# Patient Record
Sex: FEMALE | Race: WHITE | Employment: FULL TIME | URBAN - METROPOLITAN AREA
[De-identification: names, ages, dates, MRNs, and addresses within clinical notes are randomized per-mention and may not be internally consistent; named-entity substitution may affect disease eponyms.]

---

## 2017-10-25 ENCOUNTER — HOSPITAL ENCOUNTER (OUTPATIENT)
Dept: NON INVASIVE DIAGNOSTICS | Age: 62
Discharge: HOME | End: 2017-10-25
Attending: NURSE PRACTITIONER
Payer: COMMERCIAL

## 2017-10-25 DIAGNOSIS — R06.02 SOB (SHORTNESS OF BREATH): ICD-10-CM

## 2017-10-25 PROCEDURE — 93306 TTE W/DOPPLER COMPLETE: CPT

## 2017-10-30 ENCOUNTER — OFFICE VISIT (OUTPATIENT)
Dept: CARDIOLOGY CLINIC | Age: 62
End: 2017-10-30

## 2017-10-30 VITALS
OXYGEN SATURATION: 98 % | WEIGHT: 180 LBS | DIASTOLIC BLOOD PRESSURE: 70 MMHG | SYSTOLIC BLOOD PRESSURE: 120 MMHG | BODY MASS INDEX: 28.25 KG/M2 | HEART RATE: 78 BPM | HEIGHT: 67 IN | RESPIRATION RATE: 20 BRPM

## 2017-10-30 DIAGNOSIS — J45.909 UNCOMPLICATED ASTHMA, UNSPECIFIED ASTHMA SEVERITY, UNSPECIFIED WHETHER PERSISTENT: ICD-10-CM

## 2017-10-30 DIAGNOSIS — Z99.89 OSA ON CPAP: ICD-10-CM

## 2017-10-30 DIAGNOSIS — R06.00 DYSPNEA, UNSPECIFIED TYPE: ICD-10-CM

## 2017-10-30 DIAGNOSIS — I49.3 PVC (PREMATURE VENTRICULAR CONTRACTION): ICD-10-CM

## 2017-10-30 DIAGNOSIS — R07.89 CHEST TIGHTNESS: Primary | ICD-10-CM

## 2017-10-30 DIAGNOSIS — G47.33 OSA ON CPAP: ICD-10-CM

## 2017-10-30 RX ORDER — LANSOPRAZOLE 30 MG/1
CAPSULE, DELAYED RELEASE ORAL
COMMUNITY

## 2017-10-30 RX ORDER — FLUTICASONE PROPIONATE 50 MCG
2 SPRAY, SUSPENSION (ML) NASAL DAILY
COMMUNITY

## 2017-10-30 RX ORDER — FLUOXETINE HYDROCHLORIDE 40 MG/1
40 CAPSULE ORAL DAILY
COMMUNITY

## 2017-10-30 RX ORDER — GUAIFENESIN 600 MG/1
600 TABLET, EXTENDED RELEASE ORAL 2 TIMES DAILY
COMMUNITY

## 2017-10-30 RX ORDER — CHOLECALCIFEROL (VITAMIN D3) 125 MCG
CAPSULE ORAL
COMMUNITY

## 2017-10-30 RX ORDER — IBUPROFEN 400 MG/1
TABLET ORAL
COMMUNITY

## 2017-10-30 RX ORDER — MONTELUKAST SODIUM 10 MG/1
10 TABLET ORAL DAILY
COMMUNITY

## 2017-10-30 RX ORDER — ALBUTEROL SULFATE 90 UG/1
AEROSOL, METERED RESPIRATORY (INHALATION)
COMMUNITY

## 2017-10-30 RX ORDER — B-COMPLEX WITH VITAMIN C
CAPSULE ORAL
COMMUNITY

## 2017-10-30 NOTE — PROGRESS NOTES
Sangeetha Kim MD    [id number] [place] [place], [address] [facility]    Office [phone],[facility] [phone]  Pager [phone]    Vargas Olsen is a 62 y.o. female referred for evaluation of chest tightness. Consult requested by Jimmie Jeffries MD      Primary care physician:  Jimmie Jeffries MD      Chief complaint:  Vargas Olsen is a 62 y.o. female who complains of   Chief Complaint   Patient presents with    Shortness of Breath     ref by pyulmonary    Chest Pain     episodes of pvc     Dear Dr Heather Mixon,    I had the pleasure of seeing Mr. Vargas Olsen in the office today. 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 Plan:     Stress echocardiogram.  Holter monitor. Lipids per PCP  Follow-up in 3 months or earlier as needed/based on cardiac workup. Patient understands the plan. All questions were answered to the patient's satisfaction. Medication Side Effects and Warnings were discussed with patient: yes  Patient Labs were reviewed and or requested:  yes  Patient Past Records were reviewed and or requested: yes    I appreciate the opportunity to be involved in . Please see note below for details. Please do not hesitate to contact us with questions or concerns. Sangeetha Kim MD    Cardiac Testing/ Procedures: A. Cardiac Cath/PCI:    B.ECHO/ANNALISA: 10/25/17 Left ventricle: Systolic function was normal. Ejection fraction was estimated in the range of 55 % to 60 %. There were no regional wall motion  abnormalities. Doppler parameters were consistent with abnormal left ventricular relaxation (grade 1 diastolic dysfunction).     C.StressNuclear/Stress ECHO/Stress test:    D.Vascular:    E. EP:    F. 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 Initially she attributed it to her asthma. However she also had an episode of palpitations during her pulmonary MDs visit and was told that she had  PVCs. Chest tightness can occur with rest or with exertion. History of varicose veins- wears compression stockings. Mild swelling lower extremities-chronic. No prior history of CAD. Hx of being diagnosed with palpitations/MVP in her 29's. 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 No family history on file. Social History   Substance Use Topics    Smoking status: Never Smoker    Smokeless tobacco: Never Used    Alcohol use No          Medications before admission:    Current Outpatient Prescriptions   Medication Sig Dispense    ibuprofen (MOTRIN) 400 mg tablet Take  by mouth every six (6) hours as needed for Pain.  cholecalciferol, vitamin D3, (VITAMIN D3) 2,000 unit tab Take  by mouth.  montelukast (SINGULAIR) 10 mg tablet Take 10 mg by mouth daily.  LUTEIN EXTRACT/ZEAXANTHIN EXT (LUTEIN-ZEAXANTHIN) 15-0.7 mg cap Take  by mouth.  FLUoxetine (PROZAC) 40 mg capsule Take 40 mg by mouth daily.  LORATADINE (ALLERCLEAR PO) Take  by mouth.  lansoprazole (PREVACID) 30 mg capsule Take  by mouth Daily (before breakfast).  guaiFENesin (MUCINEX) 1,200 mg Ta12 ER tablet Take 1,200 mg by mouth two (2) times a day.  guaiFENesin ER (MUCINEX) 600 mg ER tablet Take 600 mg by mouth two (2) times a day.  mometasone-formoterol (DULERA) 200-5 mcg/actuation HFA inhaler Take 2 Puffs by inhalation two (2) times a day.  albuterol (VENTOLIN HFA) 90 mcg/actuation inhaler Take  by inhalation.  fluticasone (FLONASE) 50 mcg/actuation nasal spray 2 Sprays by Both Nostrils route daily.       No current facility-administered medications for this visit. Review of Systems:  (bold if positive, if negative)    Gen:  fatigueEyes:  ENT:  CVS: Pulm:  GI:    :    MS:  arthritisSkin:   hx of skin ca s/p removalPsych:  depressionEndo:    Hem:  Renal:    Neuro:        Physical Exam:  Visit Vitals    /70 (BP 1 Location: Left arm, BP Patient Position: Sitting)    Pulse 78    Resp 20    Ht 5' 7\" (1.702 m)    Wt 180 lb (81.6 kg)    SpO2 98%    BMI 28.19 kg/m2          Gen: Well-developed, well-nourished, in no acute distress  HEENT:  Pink conjunctivae, hearing intact to voice, moist mucous membranes  Neck: Supple,No JVD, No Carotid Bruit, Thyroid- non tender  Resp: No accessory muscle use, Clear breath sounds, No rales or rhonchi  Card: Regular Rate,Rythm,Normal S1, S2, No murmurs, rubs or gallop. No thrills. Abd:  Soft, non-tender, non-distended, normoactive bowel sounds are present,   MSK: No cyanosis   Skin: No rashes or ulcers  Neuro:  moving all four extremities, no focal deficit, follows commands appropriately  Psych:  Good insight, oriented to person, place and time, alert, Nml Affect  LE: No edema  Vascular: Radial Pulses 2+ and symmetric        EKG:  SR/Nml axis/NSST      Cxray:    LABS:    No results for input(s): CPK, TROIQ in the last 72 hours.     No lab exists for component: CKQMB, CPKMB    Lab Results   Component Value Date/Time    WBC 10.4 02/17/2012 04:55 AM    HGB 14.2 02/17/2012 04:55 AM    HCT 41.5 02/17/2012 04:55 AM    PLATELET 752 26/18/8933 04:55 AM    MCV 90.6 02/17/2012 04:55 AM     Lab Results   Component Value Date/Time    Sodium 140 02/17/2012 04:55 AM    Potassium 3.9 02/17/2012 04:55 AM    Chloride 108 02/17/2012 04:55 AM    CO2 23 02/17/2012 04:55 AM    Anion gap 9 02/17/2012 04:55 AM    Glucose 131 02/17/2012 04:55 AM    BUN 17 02/17/2012 04:55 AM    Creatinine 0.9 02/17/2012 04:55 AM    BUN/Creatinine ratio 19 02/17/2012 04:55 AM    GFR est AA >60 02/17/2012 04:55 AM    GFR est non-AA >60 02/17/2012 04:55 AM    Calcium 9.0 02/17/2012 04:55 AM             Poly Moore MD

## 2017-10-30 NOTE — MR AVS SNAPSHOT
Visit Information Date & Time Provider Department Dept. Phone Encounter #  
 10/30/2017 10:00 AM Andrew Case MD [facility] [phone] [id number] Upcoming Health Maintenance Date Due Hepatitis C Screening 1955 DTaP/Tdap/Td series (1 - Tdap) 11/5/1977 PAP AKA CERVICAL CYTOLOGY 11/5/1977 BREAST CANCER SCRN MAMMOGRAM 11/5/2006 FOBT Q 1 YEAR AGE 50-75 11/5/2006 ZOSTER VACCINE AGE 60> 9/5/2016 INFLUENZA AGE 9 TO ADULT 8/1/2017 Allergies as of 10/30/2017  Review Complete On: 10/30/2017 By: Jinny Summers LPN Severity Noted Reaction Type Reactions Amoxicillin  02/17/2012    Hives Erythromycin  02/17/2012    Hives Current Immunizations  Never Reviewed No immunizations on file. Not reviewed this visit You Were Diagnosed With   
  
 Codes Comments Chest tightness    -  Primary ICD-10-CM: R07.89 ICD-9-CM: 786.59   
 JOI on CPAP     ICD-10-CM: G47.33, Z99.89 ICD-9-CM: 327.23, V46.8 Uncomplicated asthma, unspecified asthma severity, unspecified whether persistent     ICD-10-CM: J45.909 ICD-9-CM: 493.90 Vitals BP Pulse Resp Height(growth percentile) Weight(growth percentile) SpO2  
 120/70 (BP 1 Location: Left arm, BP Patient Position: Sitting) 78 20 5' 7\" (1.702 m) 180 lb (81.6 kg) 98% BMI Smoking Status 28.19 kg/m2 Never Smoker Vitals History BMI and BSA Data Body Mass Index Body Surface Area  
 28.19 kg/m 2 1.96 m 2 Your Updated Medication List  
  
   
This list is accurate as of: 10/30/17 11:19 AM.  Always use your most recent med list.  
  
  
  
  
 Marcia Cleverly Take  by mouth. DULERA 200-5 mcg/actuation HFA inhaler Generic drug:  mometasone-formoterol Take 2 Puffs by inhalation two (2) times a day. FLONASE 50 mcg/actuation nasal spray Generic drug:  fluticasone 2 Sprays by Both Nostrils route daily. FLUoxetine 40 mg capsule Commonly known as:  PROzac Take 40 mg by mouth daily. ibuprofen 400 mg tablet Commonly known as:  MOTRIN Take  by mouth every six (6) hours as needed for Pain.  
  
 lansoprazole 30 mg capsule Commonly known as:  PREVACID Take  by mouth Daily (before breakfast). lutein-zeaxanthin 15-0.7 mg Cap Take  by mouth. * MUCINEX 1,200 mg Ta12 ER tablet Generic drug:  guaiFENesin Take 1,200 mg by mouth two (2) times a day. * MUCINEX 600 mg ER tablet Generic drug:  guaiFENesin ER Take 600 mg by mouth two (2) times a day. SINGULAIR 10 mg tablet Generic drug:  montelukast  
Take 10 mg by mouth daily. VENTOLIN HFA 90 mcg/actuation inhaler Generic drug:  albuterol Take  by inhalation. VITAMIN D3 2,000 unit Tab Generic drug:  cholecalciferol (vitamin D3) Take  by mouth. * Notice: This list has 2 medication(s) that are the same as other medications prescribed for you. Read the directions carefully, and ask your doctor or other care provider to review them with you. We Performed the Following AMB POC EKG ROUTINE W/ 12 LEADS, INTER & REP [06551 CPT(R)] Introducing [facility]! [place] introduces [employer] patient portal. Now you can access parts of your medical record, email your doctor's office, and request medication refills online. 1. In your internet browser, go to [email or web address]. [email or web address] 2. Click on the First Time User? Click Here link in the Sign In box. You will see the New Member Sign Up page. 3. Enter your [employer] Access Code exactly as it appears below. You will not need to use this code after youve completed the sign-up process. If you do not sign up before the expiration date, you must request a new code. · [employer] Access Code: [email or web address] 
Expires: 1/22/2018 10:54 AM 
 
4.  Enter the last four digits of your Social Security Number (xxxx) and Date of Birth (mm/dd/yyyy) as indicated and click Submit. You will be taken to the next sign-up page. 5. Create a [employer] ID. This will be your [employer] login ID and cannot be changed, so think of one that is secure and easy to remember. 6. Create a [employer] password. You can change your password at any time. 7. Enter your Password Reset Question and Answer. This can be used at a later time if you forget your password. 8. Enter your e-mail address. You will receive e-mail notification when new information is available in [address] 9. Click Sign Up. You can now view and download portions of your medical record. 10. Click the Download Summary menu link to download a portable copy of your medical information. If you have questions, please visit the Frequently Asked Questions section of the [employer] website. Remember, [employer] is NOT to be used for urgent needs. For medical emergencies, dial 911. Now available from your iPhone and Android! Please provide this summary of care documentation to your next provider. Your primary care clinician is listed as Rand Friend. If you have any questions after today's visit, please call [phone].

## 2017-11-20 ENCOUNTER — CLINICAL SUPPORT (OUTPATIENT)
Dept: CARDIOLOGY CLINIC | Age: 62
End: 2017-11-20

## 2017-11-20 DIAGNOSIS — R06.02 SOB (SHORTNESS OF BREATH): ICD-10-CM

## 2017-11-20 DIAGNOSIS — R07.89 CHEST DISCOMFORT: Primary | ICD-10-CM

## 2017-11-20 DIAGNOSIS — R00.2 PALPITATIONS: ICD-10-CM

## 2017-11-20 DIAGNOSIS — R00.2 PALPITATIONS: Primary | ICD-10-CM

## 2017-11-20 NOTE — PROGRESS NOTES
Explained procedure to patient, monitoring EKG/HR/BP, obtaining resting images, walking on treadmill, obtaining post exercise images, and risks/discomforts. All concerns and questions addressed. Consent obtained. See scanned report. Pt achieved 7. 4METS, HR of 157 (98% of THR), and SpO2 of 98% at peak exercise. Dr. Wally Vanegas ordered and Dr. Wally Vanegas read study. ID verified per protocol. Pt  reported no symptoms at completion of protocol.

## 2017-11-20 NOTE — PROGRESS NOTES
Applied holter monitor for pt per Dr Umana Conception  Dx: CP, palps. Pt has #[id number] & is due back on Tues 11/21/17.

## 2017-11-27 ENCOUNTER — DOCUMENTATION ONLY (OUTPATIENT)
Dept: CARDIOLOGY CLINIC | Age: 62
End: 2017-11-27

## 2017-11-27 ENCOUNTER — TELEPHONE (OUTPATIENT)
Dept: CARDIOLOGY CLINIC | Age: 62
End: 2017-11-27

## 2017-11-27 NOTE — TELEPHONE ENCOUNTER
Called & l/m to remind pt that her holter that was applied on 11/20/17 was due back on 11/21/17 to [facility] office. Please return as soon as possible.

## 2018-01-23 ENCOUNTER — OFFICE VISIT (OUTPATIENT)
Dept: CARDIOLOGY CLINIC | Age: 63
End: 2018-01-23

## 2018-01-23 VITALS
OXYGEN SATURATION: 97 % | SYSTOLIC BLOOD PRESSURE: 122 MMHG | BODY MASS INDEX: 28.51 KG/M2 | DIASTOLIC BLOOD PRESSURE: 82 MMHG | WEIGHT: 182 LBS | HEART RATE: 67 BPM

## 2018-01-23 DIAGNOSIS — G47.33 OSA ON CPAP: ICD-10-CM

## 2018-01-23 DIAGNOSIS — Z87.898 HISTORY OF CHEST PAIN: Primary | ICD-10-CM

## 2018-01-23 DIAGNOSIS — Z99.89 OSA ON CPAP: ICD-10-CM

## 2018-01-23 NOTE — PROGRESS NOTES
Sanju Flores MD    [id number] [facility] [place], [address] [facility]    Office [phone],[facility] [phone]  Pager [phone]    Hay Conley is a 65 y.o. female is here for f/u visit. Primary care physician:  Cora Martinez MD    Patient Active Problem List   Diagnosis Code    Uncomplicated asthma H02.892    JOI on CPAP G47.33, Z99.89       Dear Dr. Patrica Nash    I had the pleasure of seeing Ms. Hay Conley in the office today. Chief complaint:  Chief Complaint   Patient presents with    Shortness of Breath       Assessment:    Atypical chest pain/palpitations- resolved  Hx PVCs/MVP in her 29's  - nml EF on ECHO 10/2017 /No echo evidence of MVP or MR  History of asthma/JOI-on CPAP-followed by pulmonary  History of depression   History of varicose veins- wears compression stockings. Plan:   Normal stress echo, normal EF, Holter monitor-no significant arrhythmias  Aggressive cardiovascular risk factor modification. Follow-up on a as needed basis. Patient understands the plan. All questions were answered to the patient's satisfaction. Medication Side Effects and Warnings were discussed with patient: yes  Patient Labs were reviewed and or requested:  yes  Patient Past Records were reviewed and or requested: yes    I appreciate the opportunity to be involved in . See note below for details. Please do not hesitate to contact us with questions or concerns. Sanju Flores MD    Cardiac Testing/ Procedures: A. Cardiac Cath/PCI:    B.ECHO/ANNALISA: 10/25/17-EF 29-79%/QUWNP 1 diastolic dysfunction    C. StressNuclear/Stress ECHO/Stress test: 11/20/17-6 minutes 21 seconds. Normal stress echo. D.Vascular:    E. EP: 11/20/17-Holter monitor-sinus rhythm/no symptoms/no significant arrhythmias    F. Miscellaneous:    Subjective:  Hay Conley is a 65 y.o. female who returns for follow up visit. Doing well. No chest pain, dyspnea, dizziness, syncope. C/o post nasal drip /\"hoarse voice\"        ROS:  (bold if positive, if negative)             Medications before admission:    Current Outpatient Prescriptions   Medication Sig Dispense    Bifidobacterium Infantis (ALIGN) 4 mg cap Take  by mouth daily as needed.  ibuprofen (MOTRIN) 400 mg tablet Take  by mouth every six (6) hours as needed for Pain.  cholecalciferol, vitamin D3, (VITAMIN D3) 2,000 unit tab Take  by mouth.  montelukast (SINGULAIR) 10 mg tablet Take 10 mg by mouth daily.  LUTEIN EXTRACT/ZEAXANTHIN EXT (LUTEIN-ZEAXANTHIN) 15-0.7 mg cap Take  by mouth.  FLUoxetine (PROZAC) 40 mg capsule Take 40 mg by mouth daily.  lansoprazole (PREVACID) 30 mg capsule Take  by mouth Daily (before breakfast).  guaiFENesin (MUCINEX) 1,200 mg Ta12 ER tablet Take 1,200 mg by mouth two (2) times a day.  guaiFENesin ER (MUCINEX) 600 mg ER tablet Take 600 mg by mouth two (2) times a day.  mometasone-formoterol (DULERA) 200-5 mcg/actuation HFA inhaler Take 2 Puffs by inhalation two (2) times a day.  albuterol (VENTOLIN HFA) 90 mcg/actuation inhaler Take  by inhalation.  fluticasone (FLONASE) 50 mcg/actuation nasal spray 2 Sprays by Both Nostrils route daily.  LORATADINE (ALLERCLEAR PO) Take  by mouth. No current facility-administered medications for this visit. Family History of CAD:    Yes    Social History:  Current  Smoker /No    Physical Exam:  Visit Vitals    /82 (BP 1 Location: Right arm, BP Patient Position: Sitting)    Pulse 67    Wt 182 lb (82.6 kg)    SpO2 97%    BMI 28.51 kg/m2          Gen: Well-developed, well-nourished, in no acute distress  Neck: Supple,No JVD, No Carotid Bruit,   Resp: No accessory muscle use, Clear breath sounds, No rales or rhonchi  Card: Regular Rate,Rythm,Normal S1, S2, No murmurs, rubs or gallop. No thrills.    Abd:  Soft, non-tender, non-distended,BS+,   MSK: No cyanosis  Skin: No rashes    Neuro: moving all four extremities , follows commands appropriately  Psych:  Good insight, oriented to person, place , alert, Nml Affect  LE: No edema    EKG:      LABS:        Lab Results   Component Value Date/Time    WBC 10.4 02/17/2012 04:55 AM    HGB 14.2 02/17/2012 04:55 AM    HCT 41.5 02/17/2012 04:55 AM    PLATELET 948 79/12/3448 04:55 AM     Lab Results   Component Value Date/Time    Sodium 140 02/17/2012 04:55 AM    Potassium 3.9 02/17/2012 04:55 AM    Chloride 108 02/17/2012 04:55 AM    CO2 23 02/17/2012 04:55 AM    Anion gap 9 02/17/2012 04:55 AM    Glucose 131 02/17/2012 04:55 AM    BUN 17 02/17/2012 04:55 AM    Creatinine 0.9 02/17/2012 04:55 AM    BUN/Creatinine ratio 19 02/17/2012 04:55 AM    GFR est AA >60 02/17/2012 04:55 AM    GFR est non-AA >60 02/17/2012 04:55 AM    Calcium 9.0 02/17/2012 04:55 AM       No results found for: APTT  No results found for: INR, PTMR, PTP, PT1, PT2  No components found for: Sri Palmer MD

## 2018-01-23 NOTE — PROGRESS NOTES
Pt has no complaints/no cardiac concerns    Visit Vitals    /82 (BP 1 Location: Right arm, BP Patient Position: Sitting)    Pulse 67    Wt 182 lb (82.6 kg)    SpO2 97%    BMI 28.51 kg/m2

## 2019-12-13 ENCOUNTER — HOSPITAL ENCOUNTER (OUTPATIENT)
Dept: GENERAL RADIOLOGY | Age: 64
Discharge: HOME | End: 2019-12-13
Payer: COMMERCIAL

## 2019-12-13 DIAGNOSIS — J45.909: ICD-10-CM

## 2019-12-13 PROCEDURE — 71046 X-RAY EXAM CHEST 2 VIEWS: CPT

## 2022-03-19 PROBLEM — Z99.89 OSA ON CPAP: Status: ACTIVE | Noted: 2017-10-30

## 2022-03-19 PROBLEM — G47.33 OSA ON CPAP: Status: ACTIVE | Noted: 2017-10-30

## 2022-03-20 PROBLEM — J45.909 UNCOMPLICATED ASTHMA: Status: ACTIVE | Noted: 2017-10-30
